# Patient Record
Sex: MALE | Race: WHITE | Employment: FULL TIME | URBAN - NONMETROPOLITAN AREA
[De-identification: names, ages, dates, MRNs, and addresses within clinical notes are randomized per-mention and may not be internally consistent; named-entity substitution may affect disease eponyms.]

---

## 2017-12-27 NOTE — PATIENT INSTRUCTIONS
I queried the [facility] medication database. Last clonazepam prescription in July  I discussed conservative management options including individual psychotherapy however patient states he is unable to afford this at this time as he has no insurance.   Alonso ejwell

## 2017-12-27 NOTE — PROGRESS NOTES
Adnrade Chacko is a 36year old male. Patient presents with:  Medication Request  Patient states that he has been managed through multiple doctors over the last 2-1/2 years to the [facility] however he has lost palak in their management.   He is w no alcohol/ drug use, + previous hx of depression/ anxiety    EXAM:   /70   Pulse 66   Temp 99.1 °F (37.3 °C) (Temporal)   Ht 75.25\"   Wt 211 lb   SpO2 98%   BMI 26.20 kg/m²   GENERAL: well developed, well nourished,in no apparent distress, well hyd medication however he wishes to try to work through things on his own. Follow-up 1 month  The patient indicates understanding of these issues and agrees to the plan. Bell Kumar.  Marisol Lee, CONNORFP

## 2018-01-30 NOTE — PROGRESS NOTES
Jay Cordero is a 36year old male. Patient presents with: Anxiety: f/u    HPI:   Patient reports that since getting back on alprazolam he has a much better outlook on his situation. He states he is much more  focused and engaging.   He states that he ha developed, well nourished,in no apparent distress, well hydrated  SKIN: no rashes,no suspicious lesions  ENT: TMs: intact, good mobility, Nose: turbinates clear, no dc, Throat: no erythema, pnd, or lesions  NECK: supple,no adenopathy,no bruits, no thyromeg

## 2018-01-30 NOTE — PATIENT INSTRUCTIONS
[facility] database queried, no other prescribers or prescriptions noted  I reviewed appropriate use of medication.   I advised patient that I will give him 3 consecutive 1 month prescriptions for alprazolam 0.5 mg twice daily as needed, prescriptions sig

## 2018-04-21 NOTE — PROGRESS NOTES
Gomez Womack is a 36year old male. Patient presents with: Anxiety: f/u    HPI:   , [facility], working more steady, takes xanax 2 per day, patient is been on Xanax chronically for the last 7+ years.   He denies any adverse effe intact, good mobility, Nose: turbinates clear, no dc, Throat: no erythema, pnd, or lesions  NECK: supple,no adenopathy,no bruits, no thyromegaly  LUNGS: clear to auscultation, no w/r/r  CARDIO: RRR without murmur  GI: good BS's,no masses, HSM or tenderness

## 2018-04-21 NOTE — PATIENT INSTRUCTIONS
Reviewed use of alprazolam and the potentially sedative effects. Would avoid use of any other sedating medications such as antihistamines or alcohol. We will continue alprazolam 0.5 mg twice daily.   Patient was given 3 postdated consecutive 1 month presc

## 2018-07-02 RX ORDER — ALPRAZOLAM 0.5 MG/1
0.5 TABLET ORAL 2 TIMES DAILY PRN
Qty: 60 TABLET | Refills: 0 | Status: SHIPPED
Start: 2018-07-02 | End: 2018-10-24 | Stop reason: ALTCHOICE

## 2018-07-02 NOTE — TELEPHONE ENCOUNTER
Pt states he was given 3 Scripts for May 1, June 1, and this last one should be July 1,  went to refill his ALPRAZolam 0.5 MG and the start dates states July 31,   Needs to get a new RX starting today  And needs it sent to [place] [id number]

## 2018-07-14 NOTE — PATIENT INSTRUCTIONS
I will refill alprazolam for 3 consecutive 1 month prescriptions  Reviewed conservative management of stress and anxiety including relaxation breathing, daily aerobic activity, visualization, prayer, journaling etc.  Follow-up 3 months, would recommend com

## 2018-07-14 NOTE — PROGRESS NOTES
Tam Lester is a 36year old male. 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

## 2018-10-24 NOTE — PATIENT INSTRUCTIONS
I discussed alternative medication options for generalized ADD and PTSD. Patient reports previous adverse effects to Seroquel, Effexor and some kind of SSRI.   He states he is able to function quite well with alprazolam because it allows him the flexibilit

## 2018-10-24 NOTE — PROGRESS NOTES
HPI:    Patient ID: Vashti Casanova is a 39year old male. Patient presents with:   Anxiety: f/u    Xanax twice daily, unable to go without meds, previous attempts at medication resulted in adverse effects and discontinuation of Seroquel, Effexor and SS is alert and oriented to person, place, and time. Skin: Skin is warm and dry. Psychiatric: His behavior is normal. Judgment and thought content normal. His mood appears anxious. His speech is rapid and/or pressured.  Cognition and memory are normal.   B IY#[id number]

## 2018-11-05 ENCOUNTER — TELEPHONE (OUTPATIENT)
Dept: FAMILY MEDICINE CLINIC | Facility: CLINIC | Age: 43
End: 2018-11-05

## 2018-11-05 NOTE — TELEPHONE ENCOUNTER
PT CALLED WITH [facility] # [facility] [address] [facility]--[phone]---HE IS HOPING TO GO THERE TOMORROW TO GET THE REST OF HIS PRESCRIPTION

## 2018-11-14 RX ORDER — ALPRAZOLAM 0.5 MG/1
0.5 TABLET ORAL 2 TIMES DAILY PRN
Qty: 30 TABLET | Refills: 0 | Status: SHIPPED
Start: 2018-11-15 | End: 2018-11-30

## 2018-11-14 NOTE — TELEPHONE ENCOUNTER
Pt called on 11/5 re: his alprazolam refill----it was written for #60 tabs, but because the directions were 1 po qhs, the pharmacy only filled 30 tabs. I confirmed this with the pharmacy.   Gladis said they could not \"add on\" the add'l 30 tabs at that

## 2018-11-15 ENCOUNTER — TELEPHONE (OUTPATIENT)
Dept: FAMILY MEDICINE CLINIC | Facility: CLINIC | Age: 43
End: 2018-11-15

## 2018-11-15 NOTE — TELEPHONE ENCOUNTER
My apologies the quantity was correct but the signal was wrong.   Will fax remaining 30 tablet prescription to pharmacy

## 2018-11-15 NOTE — TELEPHONE ENCOUNTER
REFILL ALPRAZOLAM   WILL BE OUT AFTER TODAY. SEE PREVIOUS NOTE ABOUT DOSING AND QUANTITY.     CALL TO [facility] [place] PH# [phone]

## 2019-02-04 ENCOUNTER — TELEPHONE (OUTPATIENT)
Dept: FAMILY MEDICINE CLINIC | Facility: CLINIC | Age: 44
End: 2019-02-04

## 2019-02-04 NOTE — TELEPHONE ENCOUNTER
Spoke with pt. States he was taking alprazolam 0.5mg BID and has now decreased to 1 tab qd for the last 10days. States he is feeling well doing this, and wants to continue to decrease dose and then wean off. His last refill was 0.5mg tabs #60 on 11/1.  [facility]

## 2019-02-04 NOTE — TELEPHONE ENCOUNTER
ALPRAZolam 0.5 MG Oral Tab ---- AMOR WOULD LIKE TO SPEAK TO CHIN ABOUT TAPPERING OFF THIS MED,  HE HAS ALREADY GONE DOWN TO 1 PER DAY, FOR THE LAST WEEK AND A HALF.

## 2019-02-05 RX ORDER — ALPRAZOLAM 0.25 MG/1
0.25 TABLET ORAL
Qty: 30 TABLET | Refills: 0 | Status: SHIPPED
Start: 2019-02-05 | End: 2019-02-26

## 2019-02-26 RX ORDER — ALPRAZOLAM 0.25 MG/1
TABLET ORAL
Qty: 45 TABLET | Refills: 0 | Status: SHIPPED
Start: 2019-02-27 | End: 2019-03-27

## 2019-02-26 NOTE — TELEPHONE ENCOUNTER
Alprazolam  Has been taking 1.5 of the .25mg tablets-   He is gradually tapering off the dose.   Wants to discuss with you what he has been doing

## 2019-02-26 NOTE — TELEPHONE ENCOUNTER
Pt calls. Alprazolam dose was decreased to 0.25mg qd on 2/4. States he was having a difficult time with that decrease, so he has been taking 1 1/2 tabs instead. Pt requests a refill to reflect these directions and qty. Last RF was 0.25mg #30 on 2/5.   (I d

## 2019-03-27 RX ORDER — ALPRAZOLAM 0.25 MG/1
TABLET ORAL
Qty: 30 TABLET | Refills: 0 | Status: SHIPPED
Start: 2019-03-27 | End: 2019-04-29

## 2019-03-27 NOTE — TELEPHONE ENCOUNTER
REFILL ALPRAZolam 0.25 MG 1 PER DAY (NOT 1 1/2), SEND TO [facility]  [address], [place]

## 2019-03-27 NOTE — TELEPHONE ENCOUNTER
Last RF was #45 on 2/27 with directions of 1 1/1 tabs qd. *PT IS REQUESTING DIRECTIONS BE DECREASED TO 1 PO QD.   QTY CHANGED TO #30

## 2019-04-29 RX ORDER — ALPRAZOLAM 0.25 MG/1
TABLET ORAL
Qty: 15 TABLET | Refills: 0 | Status: SHIPPED
Start: 2019-04-29 | End: 2019-06-12 | Stop reason: DRUGHIGH

## 2019-04-29 NOTE — TELEPHONE ENCOUNTER
Last RF was #30 on 3/27 with directions of 1 po qd. Pt is asking for only 1/2 tab qd now. I changed the directions to 1/2-1 tab qd, but can change it to just the 1/2 tab and see how he does. Qty of #15 okay?

## 2019-04-29 NOTE — TELEPHONE ENCOUNTER
ALPRAZolam 0.25 MG Oral Tab     PT IS REQUESTING JUST HALF OF A 0.25 DOSE PER DAY. PLEASE SEND TO [facility]    ALSO, PT SAYS THIS WILL HELP HIM SLEEP BETTER. PLEASE CALL IF THERE ARE QUESTIONS.

## 2019-05-06 ENCOUNTER — OFFICE VISIT (OUTPATIENT)
Dept: FAMILY MEDICINE CLINIC | Facility: CLINIC | Age: 44
End: 2019-05-06
Payer: COMMERCIAL

## 2019-05-06 VITALS
HEIGHT: 75 IN | BODY MASS INDEX: 27.35 KG/M2 | TEMPERATURE: 97 F | WEIGHT: 220 LBS | DIASTOLIC BLOOD PRESSURE: 80 MMHG | HEART RATE: 82 BPM | OXYGEN SATURATION: 97 % | SYSTOLIC BLOOD PRESSURE: 132 MMHG | RESPIRATION RATE: 12 BRPM

## 2019-05-06 DIAGNOSIS — R68.82 LOW LIBIDO: ICD-10-CM

## 2019-05-06 DIAGNOSIS — F31.9 BIPOLAR 1 DISORDER (HCC): ICD-10-CM

## 2019-05-06 DIAGNOSIS — N52.9 ERECTILE DYSFUNCTION, UNSPECIFIED ERECTILE DYSFUNCTION TYPE: ICD-10-CM

## 2019-05-06 DIAGNOSIS — Z00.00 LABORATORY EXAM ORDERED AS PART OF ROUTINE GENERAL MEDICAL EXAMINATION: ICD-10-CM

## 2019-05-06 DIAGNOSIS — F43.10 PTSD (POST-TRAUMATIC STRESS DISORDER): ICD-10-CM

## 2019-05-06 DIAGNOSIS — Z00.00 PREVENTATIVE HEALTH CARE: Primary | ICD-10-CM

## 2019-05-06 DIAGNOSIS — K40.20 NON-RECURRENT BILATERAL INGUINAL HERNIA WITHOUT OBSTRUCTION OR GANGRENE: ICD-10-CM

## 2019-05-06 DIAGNOSIS — F41.1 GENERALIZED ANXIETY DISORDER: ICD-10-CM

## 2019-05-06 DIAGNOSIS — Z13.29 SCREENING FOR THYROID DISORDER: ICD-10-CM

## 2019-05-06 DIAGNOSIS — Z13.220 SCREENING FOR LIPID DISORDERS: ICD-10-CM

## 2019-05-06 DIAGNOSIS — F17.200 TOBACCO DEPENDENCE: ICD-10-CM

## 2019-05-06 PROCEDURE — 99396 PREV VISIT EST AGE 40-64: CPT | Performed by: FAMILY MEDICINE

## 2019-05-06 RX ORDER — BUSPIRONE HYDROCHLORIDE 10 MG/1
10 TABLET ORAL 2 TIMES DAILY
Qty: 60 TABLET | Refills: 1 | Status: SHIPPED | OUTPATIENT
Start: 2019-05-06 | End: 2019-06-12

## 2019-05-06 NOTE — H&P
Harrison Lake is a 39year old male who presents for a complete physical exam.   HPI:   Pt voices concerns of ED, anxiety.     Wt Readings from Last 6 Encounters:  05/06/19 : 220 lb  10/24/18 : 228 lb 2 oz  07/14/18 : 225 lb  04/21/18 : 226 lb  01/30/18 dysfunction, low decreased libido   MUSCULOSKELETAL: denies back or joint pain  NEURO: denies headaches, tremors, dizziness, numbness and weakness  PSYCHE: denies depression , denies any sleep difficulty, pt has been weaning off xanax, anxiety increasing b libido  Non-recurrent bilateral inguinal hernia without obstruction or gangrene  Laboratory exam ordered as part of routine general medical examination  Screening for thyroid disorder  Screening for lipid disorders  Orders Placed This Encounter      Lipid

## 2019-05-06 NOTE — PATIENT INSTRUCTIONS
I reviewed age-appropriate preventive health screening exams as well as immunizations. Patient declines any blood work or any needle sticks. We discussed various causes of erectile dysfunction and libido.   I advised patient that to further evaluate his c

## 2019-06-04 ENCOUNTER — MED REC SCAN ONLY (OUTPATIENT)
Dept: FAMILY MEDICINE CLINIC | Facility: CLINIC | Age: 44
End: 2019-06-04

## 2019-06-05 ENCOUNTER — PATIENT OUTREACH (OUTPATIENT)
Dept: FAMILY MEDICINE CLINIC | Facility: CLINIC | Age: 44
End: 2019-06-05

## 2019-06-12 PROCEDURE — 84439 ASSAY OF FREE THYROXINE: CPT | Performed by: FAMILY MEDICINE

## 2019-06-12 PROCEDURE — 36415 COLL VENOUS BLD VENIPUNCTURE: CPT | Performed by: FAMILY MEDICINE

## 2019-06-12 PROCEDURE — 80053 COMPREHEN METABOLIC PANEL: CPT | Performed by: FAMILY MEDICINE

## 2019-06-12 PROCEDURE — 80061 LIPID PANEL: CPT | Performed by: FAMILY MEDICINE

## 2019-06-12 PROCEDURE — 84443 ASSAY THYROID STIM HORMONE: CPT | Performed by: FAMILY MEDICINE

## 2019-06-12 NOTE — PATIENT INSTRUCTIONS
I reviewed conservative management of stress and anxiety including relaxation breathing, prayer, journaling, visualization etc.  Okay to resume alprazolam 0.5 mg up to twice daily as needed  We will start sertraline 25 mg nightly for 2 weeks and increase t

## 2019-06-12 NOTE — PROGRESS NOTES
HPI:    Patient ID: Chandler Rolon is a 39year old male.     Patient presents with:  Lab  Anxiety    Going to therapy weekly,testing for PTSD later this week, increased anxiety, off xanax for several weeks  Pt hints at past emotional traumas  Pt states h rapid and/or pressured. Cognition and memory are normal.   Blood pressure 118/78, pulse 69, temperature 98.1 °F (36.7 °C), temperature source Temporal, height 75\", weight 212 lb, SpO2 96 %.          ASSESSMENT/PLAN:   Bipolar 1 disorder (hcc)  (primary enc

## 2019-07-11 NOTE — TELEPHONE ENCOUNTER
L/m on pt's v/m-----  1. Sertraline should have 2 refills available @ the pharmacy. Advised to call them to fill. 2. Advised DG out of ofc until tomorrow, so Xanax refill request will be addressed at that time.   *LAST RF #60 ON 6/12/19

## 2019-07-12 RX ORDER — ALPRAZOLAM 0.5 MG/1
0.5 TABLET ORAL 2 TIMES DAILY PRN
Qty: 60 TABLET | Refills: 0 | Status: SHIPPED | OUTPATIENT
Start: 2019-07-12 | End: 2019-08-11

## 2019-07-12 RX ORDER — ALPRAZOLAM 0.5 MG/1
0.5 TABLET ORAL NIGHTLY PRN
Qty: 60 TABLET | Refills: 0 | Status: SHIPPED | OUTPATIENT
Start: 2019-08-12 | End: 2019-07-12

## 2019-07-12 RX ORDER — ALPRAZOLAM 0.5 MG/1
0.5 TABLET ORAL 2 TIMES DAILY PRN
Qty: 60 TABLET | Refills: 0 | Status: SHIPPED | OUTPATIENT
Start: 2019-07-12 | End: 2019-07-12

## 2019-07-12 NOTE — PROGRESS NOTES
HPI:    Patient ID: Autumn Camp is a 39year old male. Patient presents with:   Anxiety: f/u after starting sertraline  Follow - Up: labs        Sertraline started 3 weeks ago as well as alprazolam up to twice daily  Pt feels he has emotional \"blun oriented to person, place, and time. Psychiatric: His behavior is normal. Judgment and thought content normal. His mood appears anxious. His speech is rapid and/or pressured.  Cognition and memory are normal.    Blood pressure 120/80, pulse 80, temperatur 15 - 37 U/L Final   • ALT 06/12/2019 28  16 - 61 U/L Final   • Alkaline Phosphatase 06/12/2019 104  45 - 117 U/L Final   • Bilirubin, Total 06/12/2019 0.8  0.1 - 2.0 mg/dL Final   • Total Protein 06/12/2019 7.2  6.4 - 8.2 g/dL Final   • Albumin 06/12/2019 weeks.  If patient is unhappy with response to sertraline in general, will need to decide alternative therapy. Janelle Hu. DO Ray, FAAFP    No orders of the defined types were placed in this encounter.       Meds This Visit:  Requested Prescriptions

## 2019-07-12 NOTE — PATIENT INSTRUCTIONS
I reviewed all the labs in detail including the lipid panel in particular. I discussed the implication of the HDL and LDL.   I advised patient I would not recommend medication at this point, rather would have him focus on daily aerobic activity, weight red

## 2019-08-12 RX ORDER — ALPRAZOLAM 0.5 MG/1
0.5 TABLET ORAL 2 TIMES DAILY PRN
Qty: 60 TABLET | Refills: 0 | Status: SHIPPED | OUTPATIENT
Start: 2019-08-12 | End: 2019-09-10

## 2019-09-10 RX ORDER — ALPRAZOLAM 0.5 MG/1
0.5 TABLET ORAL 2 TIMES DAILY PRN
Qty: 60 TABLET | Refills: 0 | Status: SHIPPED | OUTPATIENT
Start: 2019-09-10 | End: 2019-10-08

## 2019-10-07 NOTE — TELEPHONE ENCOUNTER
Last office visit: 7/12/2019  Last refill: 10/10/2019  Requested Prescriptions     Pending Prescriptions Disp Refills   • ALPRAZolam 0.5 MG Oral Tab 60 tablet 0     Sig: Take 1 tablet (0.5 mg total) by mouth 2 (two) times daily as needed for Sleep or Anxie

## 2019-10-08 RX ORDER — ALPRAZOLAM 0.5 MG/1
0.5 TABLET ORAL 2 TIMES DAILY PRN
Qty: 60 TABLET | Refills: 0 | Status: SHIPPED | OUTPATIENT
Start: 2019-10-08 | End: 2019-11-07

## 2019-11-07 RX ORDER — ALPRAZOLAM 0.5 MG/1
0.5 TABLET ORAL 2 TIMES DAILY PRN
Qty: 60 TABLET | Refills: 0 | Status: SHIPPED | OUTPATIENT
Start: 2019-11-07 | End: 2019-12-06

## 2019-12-06 RX ORDER — ALPRAZOLAM 0.5 MG/1
0.5 TABLET ORAL 2 TIMES DAILY PRN
Qty: 60 TABLET | Refills: 0 | Status: SHIPPED | OUTPATIENT
Start: 2019-12-06 | End: 2020-01-03

## 2020-01-03 RX ORDER — ALPRAZOLAM 0.5 MG/1
0.5 TABLET ORAL 2 TIMES DAILY PRN
Qty: 60 TABLET | Refills: 0 | Status: SHIPPED | OUTPATIENT
Start: 2020-01-03 | End: 2020-02-03

## 2020-01-03 NOTE — TELEPHONE ENCOUNTER
ALPRAZolam 0.5 MG Oral Tab Please call into [facility] in [place], pt has 3 pills left if we could please call this in today

## 2020-01-25 ENCOUNTER — TELEPHONE (OUTPATIENT)
Dept: FAMILY MEDICINE CLINIC | Facility: CLINIC | Age: 45
End: 2020-01-25

## 2020-01-25 NOTE — TELEPHONE ENCOUNTER
Pt is looking for generalized genetic testing. Pt visited a clinic named [facility]. He was informed that if he were to do the genetic testing there, the doctor that he would see would HAVE to be his new PCP.  Pt doesn't want to lose Dr. Berny Vance as hi

## 2020-01-25 NOTE — TELEPHONE ENCOUNTER
Rosmery Petit was calling to see if Dr Joselyn Goodell would order a DNA test, example [employer], if possible please fax it to [phone]

## 2020-01-27 NOTE — TELEPHONE ENCOUNTER
If the patient wants \"generalized genetic testing\" he has to buy the kit and all of that. I do not ordered simply generic testing.

## 2020-02-04 RX ORDER — ALPRAZOLAM 0.5 MG/1
0.5 TABLET ORAL 2 TIMES DAILY PRN
Qty: 60 TABLET | Refills: 0 | Status: SHIPPED | OUTPATIENT
Start: 2020-02-04 | End: 2020-03-05

## 2020-04-01 ENCOUNTER — TELEPHONE (OUTPATIENT)
Dept: FAMILY MEDICINE CLINIC | Facility: CLINIC | Age: 45
End: 2020-04-01

## 2020-07-15 NOTE — PROGRESS NOTES
HPI:    Patient ID: Alana Hobbs is a 38year old male. Patient presents with:   Anxiety  Medication Follow-Up    Wt up 18# since last year  Still smoking, wants to quit smoking, girlfriend smoking, more stressors, put a bid on a house in [place],  and intact distal pulses. No murmur heard. Pulmonary/Chest: Effort normal and breath sounds normal.   Neurological: He is alert and oriented to person, place, and time. Skin: Skin is warm and dry.    Psychiatric: His behavior is normal. Judgment and th Tartrate 1 MG Oral Tab 60 tablet 1     Sig: Take 1 tablet (1 mg total) by mouth 2 (two) times daily.    • Varenicline Tartrate 0.5 MG X 11 & 1 MG X 42 Oral Misc 1 Package 0     Sig: As directed   • Terbinafine HCl 250 MG Oral Tab 42 tablet 1     Sig: Take 1

## 2020-07-15 NOTE — PATIENT INSTRUCTIONS
Total of 30 minutes was spent with the patient in education and counseling. Discussed smoking cessation strategies. Including behavior modification, medications, risk, benefits, side effects, alternatives, anticipated response and duration of treatment.

## 2020-08-11 RX ORDER — VARENICLINE TARTRATE 1 MG/1
TABLET, FILM COATED ORAL
Qty: 60 TABLET | Refills: 1 | OUTPATIENT
Start: 2020-08-11

## 2020-08-11 NOTE — TELEPHONE ENCOUNTER
Last refill: 7/15/20 #60 w/ 1 refill  Last OV: 7/15/20    No future appointments. Refill too soon.  Has 1 refill left

## 2020-09-09 RX ORDER — VARENICLINE TARTRATE 1 MG/1
TABLET, FILM COATED ORAL
Qty: 60 TABLET | Refills: 1 | Status: SHIPPED | OUTPATIENT
Start: 2020-09-09

## 2020-09-12 DIAGNOSIS — B35.1 ONYCHOMYCOSIS OF TOENAIL: Primary | ICD-10-CM

## 2020-09-12 DIAGNOSIS — Z79.899 ENCOUNTER FOR LONG-TERM (CURRENT) DRUG USE: ICD-10-CM

## 2020-09-14 RX ORDER — TERBINAFINE HYDROCHLORIDE 250 MG/1
TABLET ORAL
Qty: 30 TABLET | Refills: 2 | OUTPATIENT
Start: 2020-09-14

## 2020-09-14 NOTE — TELEPHONE ENCOUNTER
Last OV was on 7/15/20. Terbinafine was prescribed as 1 po qd x6 wks (#42), then pt was to have labs done (LFT's), then if labs were okay, he would take the add'l 6 weeks (#42).    (It appears that the pharmacy may have had it as #30 with 3 refills instead

## 2022-04-22 NOTE — PATIENT INSTRUCTIONS
I advised patient that I suspect his episode was due to benzodiazepine withdrawal.  I have asked him to follow-up with his psychiatrist I discuss medication refills  I strongly encourage patient to begin the lamotrigine as prescribed by his psychiatrist.  Will contact behavioral navigator to assist in finding participating therapist in his area  I wrote a note for him to return to work on Monday  Emotional support provided  A total of 30 minutes was spent with the patient

## 2025-05-07 ENCOUNTER — PATIENT OUTREACH (OUTPATIENT)
Dept: CASE MANAGEMENT | Age: 50
End: 2025-05-07

## 2025-05-07 NOTE — PROCEDURES
The office order for PCP removal request is Approved and finalized on May 7, 2025.    Removed Blu Bell DO as the patient's Primary Care Physician

## (undated) NOTE — LETTER
Date: 4/22/2022    Patient Name: Mercedes Wilkinson          To Whom it may concern: This letter has been written at the patient's request. The above patient was seen at the [facility] for treatment of a medical condition. This patient should be excused from attending work from 4/18/22 through 4/24/22. The patient may return to work on 4/25/22 w/o restrictions. Sincerely,    Alana Palmer.  Loni Castelan

## (undated) NOTE — LETTER
10/14/20        Texline McGill  [address] [place]      Dear Brisa Campbell,    Our records indicate that you have outstanding lab work and or testing that was ordered for you and has not yet been completed:  Orders Placed This Encounter      Hepatic